# Patient Record
Sex: FEMALE
[De-identification: names, ages, dates, MRNs, and addresses within clinical notes are randomized per-mention and may not be internally consistent; named-entity substitution may affect disease eponyms.]

---

## 2017-07-06 PROBLEM — Z00.00 ENCOUNTER FOR PREVENTIVE HEALTH EXAMINATION: Status: ACTIVE | Noted: 2017-07-06

## 2017-07-12 ENCOUNTER — APPOINTMENT (OUTPATIENT)
Dept: ORTHOPEDIC SURGERY | Facility: CLINIC | Age: 67
End: 2017-07-12

## 2017-07-12 VITALS — BODY MASS INDEX: 22.45 KG/M2 | HEIGHT: 55 IN | WEIGHT: 97 LBS

## 2017-07-12 DIAGNOSIS — K29.60 OTHER GASTRITIS W/OUT BLEEDING: ICD-10-CM

## 2017-07-12 DIAGNOSIS — M79.671 PAIN IN RIGHT FOOT: ICD-10-CM

## 2017-07-12 DIAGNOSIS — M25.571 PAIN IN RIGHT ANKLE AND JOINTS OF RIGHT FOOT: ICD-10-CM

## 2017-07-12 DIAGNOSIS — G89.29 PAIN IN RIGHT FOOT: ICD-10-CM

## 2017-07-12 DIAGNOSIS — Z80.9 FAMILY HISTORY OF MALIGNANT NEOPLASM, UNSPECIFIED: ICD-10-CM

## 2017-07-12 DIAGNOSIS — Z82.49 FAMILY HISTORY OF ISCHEMIC HEART DISEASE AND OTHER DISEASES OF THE CIRCULATORY SYSTEM: ICD-10-CM

## 2017-07-12 DIAGNOSIS — M76.71 PERONEAL TENDINITIS, RIGHT LEG: ICD-10-CM

## 2017-07-26 ENCOUNTER — APPOINTMENT (OUTPATIENT)
Dept: ORTHOPEDIC SURGERY | Facility: CLINIC | Age: 67
End: 2017-07-26

## 2017-11-22 ENCOUNTER — APPOINTMENT (OUTPATIENT)
Dept: ORTHOPEDIC SURGERY | Facility: CLINIC | Age: 67
End: 2017-11-22
Payer: MEDICARE

## 2017-11-22 VITALS — BODY MASS INDEX: 22.45 KG/M2 | HEIGHT: 55 IN | WEIGHT: 97 LBS | RESPIRATION RATE: 16 BRPM

## 2017-11-22 DIAGNOSIS — G56.03 CARPAL TUNNEL SYNDROM,BILATERAL UPPER LIMBS: ICD-10-CM

## 2017-11-22 PROCEDURE — 73110 X-RAY EXAM OF WRIST: CPT | Mod: 50

## 2017-11-22 PROCEDURE — 99203 OFFICE O/P NEW LOW 30 MIN: CPT

## 2017-11-22 RX ORDER — CYCLOBENZAPRINE HYDROCHLORIDE 5 MG/1
5 TABLET, FILM COATED ORAL
Qty: 30 | Refills: 0 | Status: DISCONTINUED | COMMUNITY
Start: 2017-11-08

## 2017-11-22 RX ORDER — TRIAMCINOLONE ACETONIDE 1 MG/G
0.1 CREAM TOPICAL
Qty: 30 | Refills: 0 | Status: DISCONTINUED | COMMUNITY
Start: 2017-06-02

## 2017-11-22 RX ORDER — RISEDRONATE SODIUM 150 MG/1
150 TABLET, FILM COATED ORAL
Qty: 3 | Refills: 0 | Status: DISCONTINUED | COMMUNITY
Start: 2017-06-24

## 2021-10-18 ENCOUNTER — APPOINTMENT (OUTPATIENT)
Dept: ORTHOPEDIC SURGERY | Facility: CLINIC | Age: 71
End: 2021-10-18
Payer: MEDICARE

## 2021-10-18 PROCEDURE — 73564 X-RAY EXAM KNEE 4 OR MORE: CPT | Mod: LT,RT

## 2021-10-18 PROCEDURE — 99203 OFFICE O/P NEW LOW 30 MIN: CPT

## 2021-10-18 RX ORDER — CLINDAMYCIN HYDROCHLORIDE 150 MG/1
150 CAPSULE ORAL
Qty: 28 | Refills: 0 | Status: COMPLETED | COMMUNITY
Start: 2021-06-08

## 2021-10-18 RX ORDER — CHLORHEXIDINE GLUCONATE, 0.12% ORAL RINSE 1.2 MG/ML
0.12 SOLUTION DENTAL
Qty: 473 | Refills: 0 | Status: COMPLETED | COMMUNITY
Start: 2021-05-24

## 2021-10-18 NOTE — PHYSICAL EXAM
[LE] : Sensory: Intact in bilateral lower extremities [Normal RLE] : Right Lower Extremity: No scars, rashes, lesions, ulcers, skin intact [Normal LLE] : Left Lower Extremity: No scars, rashes, lesions, ulcers, skin intact [Normal Touch] : sensation intact for touch [Normal] : Oriented to person, place, and time, insight and judgement were intact and the affect was normal [de-identified] : Knees\par Nonantalgic gait in the office.\par No edema, ecchymoses, erythema.\par No effusion.\par Left knee is with tenderness on the medial patella facet and medial joint line much greater than the medial joint line tenderness on the right knee.\par Negative Perry.  Ia Lachman.  Negative anterior and posterior drawer.\par Intact extensor mechanism.\par Range of motion left knee is 0 to 125 degrees with pain on further flexion versus 130 to 135 degrees on the right knee without pain.  No crepitus.\par Normal neurovascular exam distally [de-identified] : \par X-rays of the knees weightbearing 4 views taken today showed mild joint space narrowing in the knee but no significant osteophytes.  On the flexed view the joint space does look to be slightly narrowed.  Patella [place] bilaterally

## 2021-10-18 NOTE — ASSESSMENT
[FreeTextEntry1] : 71-year-old left knee pain over the last couple months without any injury.\par She likely has some mild arthritis in the knee.  I think there may be a medial meniscus tear, degenerative in nature and less likely insufficiency fracture.  I discussed the various possibilities.\par I recommended that she decrease the walking temporarily to avoid aggravating the knee.  Heat and ice.  Anti-inflammatory will be taken either ibuprofen or naproxen over-the-counter strength for couple weeks with food.\par If it is hurting to walk then a cane would be advised.\par She can use topical ointments as well.\par On her own she can do some straight leg raises.  She was referred to physical therapy as well.\par She was given a prescription to get an MRI if its not getting better with the treatment prescribed and otherwise should follow-up in about 4 to 5 weeks to check and make sure it is improving.  We may consider a steroid injection.\par
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
## 2021-11-05 ENCOUNTER — NON-APPOINTMENT (OUTPATIENT)
Age: 71
End: 2021-11-05

## 2021-12-14 PROBLEM — M23.304: Status: ACTIVE | Noted: 2021-12-01

## 2021-12-14 PROBLEM — M94.262 CHONDROMALACIA OF LEFT KNEE: Status: ACTIVE | Noted: 2021-12-01

## 2021-12-14 PROBLEM — M25.562 CHRONIC PAIN OF LEFT KNEE: Status: ACTIVE | Noted: 2021-10-18

## 2021-12-15 ENCOUNTER — APPOINTMENT (OUTPATIENT)
Dept: ORTHOPEDIC SURGERY | Facility: CLINIC | Age: 71
End: 2021-12-15
Payer: MEDICARE

## 2021-12-15 DIAGNOSIS — M25.562 PAIN IN LEFT KNEE: ICD-10-CM

## 2021-12-15 DIAGNOSIS — G89.29 PAIN IN LEFT KNEE: ICD-10-CM

## 2021-12-15 DIAGNOSIS — M23.42 LOOSE BODY IN KNEE, LEFT KNEE: ICD-10-CM

## 2021-12-15 DIAGNOSIS — M23.304 OTHER MENISCUS DERANGEMENTS, UNSPECIFIED MEDIAL MENISCUS, LEFT KNEE: ICD-10-CM

## 2021-12-15 DIAGNOSIS — M94.262 CHONDROMALACIA, LEFT KNEE: ICD-10-CM

## 2021-12-15 PROCEDURE — 99214 OFFICE O/P EST MOD 30 MIN: CPT

## 2021-12-15 NOTE — HISTORY OF PRESENT ILLNESS
[de-identified] : Ms. Bowser comes in for follow-up for her left knee.  She had the MRI of the left knee about 5 weeks ago described below.\par Her knee is feeling better than it had been.  She feels that there has been some improvement.\par She went to physical therapy which she found to be helpful and she like to continue with therapy since its helping.  She does get an occasional catching or feeling of instability in her knee intermittently but no significant locking.  Most the time walking straight on level ground is not painful.  Twisting on her knee can cause pain.\par It still stiff and she cannot flex it quite as well.  She had taken ibuprofen but not now.

## 2021-12-15 NOTE — ASSESSMENT
[FreeTextEntry1] : More71-year-old left knee pain over the last couple months without any injury.  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  Less likely there could be a PVNS like a chondral lesion she may.  Her knee is feeling better than it had been and she is not anxious to do any procedures on her knee.  We will give it more time since it is improving.  Heat and ice intermittently and continue with home exercises and physical therapy.  Ibuprofen as needed.  She should avoid deep bending or twisting on the knee.\par Follow-up in about 5 to 6 weeks or sooner if there are any problems.

## 2021-12-29 ENCOUNTER — TRANSCRIPTION ENCOUNTER (OUTPATIENT)
Age: 71
End: 2021-12-29

## 2025-04-22 ENCOUNTER — APPOINTMENT (OUTPATIENT)
Dept: ORTHOPEDIC SURGERY | Facility: CLINIC | Age: 75
End: 2025-04-22
Payer: MEDICARE

## 2025-04-22 DIAGNOSIS — M70.61 TROCHANTERIC BURSITIS, RIGHT HIP: ICD-10-CM

## 2025-04-22 DIAGNOSIS — Z60.2 PROBLEMS RELATED TO LIVING ALONE: ICD-10-CM

## 2025-04-22 DIAGNOSIS — M47.816 SPONDYLOSIS W/OUT MYELOPATHY OR RADICULOPATHY, LUMBAR REGION: ICD-10-CM

## 2025-04-22 DIAGNOSIS — Z78.9 OTHER SPECIFIED HEALTH STATUS: ICD-10-CM

## 2025-04-22 DIAGNOSIS — Z87.39 PERSONAL HISTORY OF OTHER DISEASES OF THE MUSCULOSKELETAL SYSTEM AND CONNECTIVE TISSUE: ICD-10-CM

## 2025-04-22 PROCEDURE — 99203 OFFICE O/P NEW LOW 30 MIN: CPT

## 2025-04-22 PROCEDURE — 73502 X-RAY EXAM HIP UNI 2-3 VIEWS: CPT | Mod: RT

## 2025-04-22 RX ORDER — RISEDRONATE SODIUM 129; 21 MG/1; MG/1
TABLET, FILM COATED ORAL
Refills: 0 | Status: ACTIVE | COMMUNITY

## 2025-04-22 SDOH — SOCIAL STABILITY - SOCIAL INSECURITY: PROBLEMS RELATED TO LIVING ALONE: Z60.2
